# Patient Record
Sex: FEMALE | Race: WHITE | Employment: PART TIME | URBAN - METROPOLITAN AREA
[De-identification: names, ages, dates, MRNs, and addresses within clinical notes are randomized per-mention and may not be internally consistent; named-entity substitution may affect disease eponyms.]

---

## 2017-05-17 ENCOUNTER — OFFICE VISIT (OUTPATIENT)
Dept: INTERNAL MEDICINE CLINIC | Age: 57
End: 2017-05-17

## 2017-05-17 VITALS
BODY MASS INDEX: 33.12 KG/M2 | OXYGEN SATURATION: 98 % | WEIGHT: 194 LBS | HEART RATE: 72 BPM | HEIGHT: 64 IN | DIASTOLIC BLOOD PRESSURE: 87 MMHG | SYSTOLIC BLOOD PRESSURE: 130 MMHG

## 2017-05-17 DIAGNOSIS — I10 BENIGN ESSENTIAL HTN: Primary | ICD-10-CM

## 2017-05-17 DIAGNOSIS — E55.9 VITAMIN D DEFICIENCY: ICD-10-CM

## 2017-05-17 DIAGNOSIS — G43.009 MIGRAINE WITHOUT AURA AND WITHOUT STATUS MIGRAINOSUS, NOT INTRACTABLE: ICD-10-CM

## 2017-05-17 DIAGNOSIS — R35.0 URINARY FREQUENCY: ICD-10-CM

## 2017-05-17 DIAGNOSIS — Z13.1 SCREENING FOR DIABETES MELLITUS: ICD-10-CM

## 2017-05-17 PROCEDURE — 99204 OFFICE O/P NEW MOD 45 MIN: CPT | Performed by: INTERNAL MEDICINE

## 2017-05-17 RX ORDER — DILTIAZEM HYDROCHLORIDE 360 MG/1
360 CAPSULE, EXTENDED RELEASE ORAL DAILY
Qty: 30 CAPSULE | Refills: 3 | Status: SHIPPED | OUTPATIENT
Start: 2017-05-17 | End: 2017-10-14 | Stop reason: SDUPTHER

## 2017-05-17 RX ORDER — HYDROCHLOROTHIAZIDE 25 MG/1
25 TABLET ORAL DAILY
COMMUNITY
End: 2017-05-17 | Stop reason: SDUPTHER

## 2017-05-17 RX ORDER — DILTIAZEM HYDROCHLORIDE EXTENDED-RELEASE TABLETS 240 MG/1
240 TABLET, EXTENDED RELEASE ORAL DAILY
COMMUNITY
End: 2017-05-17

## 2017-05-17 RX ORDER — HYDROCHLOROTHIAZIDE 25 MG/1
25 TABLET ORAL DAILY
Qty: 90 TABLET | Refills: 1 | Status: SHIPPED | OUTPATIENT
Start: 2017-05-17 | End: 2017-12-14 | Stop reason: SDUPTHER

## 2017-05-17 ASSESSMENT — ENCOUNTER SYMPTOMS
ABDOMINAL PAIN: 1
WHEEZING: 1

## 2017-06-08 ENCOUNTER — TELEPHONE (OUTPATIENT)
Dept: INTERNAL MEDICINE CLINIC | Age: 57
End: 2017-06-08

## 2017-06-23 DIAGNOSIS — E55.9 VITAMIN D DEFICIENCY: ICD-10-CM

## 2017-06-23 DIAGNOSIS — I10 BENIGN ESSENTIAL HTN: ICD-10-CM

## 2017-06-23 LAB
A/G RATIO: 1.5 (ref 1.1–2.2)
ALBUMIN SERPL-MCNC: 4.6 G/DL (ref 3.4–5)
ALP BLD-CCNC: 109 U/L (ref 40–129)
ALT SERPL-CCNC: 30 U/L (ref 10–40)
ANION GAP SERPL CALCULATED.3IONS-SCNC: 17 MMOL/L (ref 3–16)
AST SERPL-CCNC: 22 U/L (ref 15–37)
BASOPHILS ABSOLUTE: 0 K/UL (ref 0–0.2)
BASOPHILS RELATIVE PERCENT: 0.9 %
BILIRUB SERPL-MCNC: 0.4 MG/DL (ref 0–1)
BUN BLDV-MCNC: 19 MG/DL (ref 7–20)
CALCIUM SERPL-MCNC: 10.5 MG/DL (ref 8.3–10.6)
CHLORIDE BLD-SCNC: 98 MMOL/L (ref 99–110)
CHOLESTEROL, TOTAL: 179 MG/DL (ref 0–199)
CO2: 25 MMOL/L (ref 21–32)
CREAT SERPL-MCNC: 0.7 MG/DL (ref 0.6–1.1)
EOSINOPHILS ABSOLUTE: 0.3 K/UL (ref 0–0.6)
EOSINOPHILS RELATIVE PERCENT: 5.5 %
GFR AFRICAN AMERICAN: >60
GFR NON-AFRICAN AMERICAN: >60
GLOBULIN: 3.1 G/DL
GLUCOSE BLD-MCNC: 105 MG/DL (ref 70–99)
HCT VFR BLD CALC: 41.6 % (ref 36–48)
HDLC SERPL-MCNC: 81 MG/DL (ref 40–60)
HEMOGLOBIN: 13.7 G/DL (ref 12–16)
LDL CHOLESTEROL CALCULATED: 82 MG/DL
LYMPHOCYTES ABSOLUTE: 1.7 K/UL (ref 1–5.1)
LYMPHOCYTES RELATIVE PERCENT: 31.6 %
MCH RBC QN AUTO: 30 PG (ref 26–34)
MCHC RBC AUTO-ENTMCNC: 32.9 G/DL (ref 31–36)
MCV RBC AUTO: 91 FL (ref 80–100)
MONOCYTES ABSOLUTE: 0.5 K/UL (ref 0–1.3)
MONOCYTES RELATIVE PERCENT: 9.3 %
NEUTROPHILS ABSOLUTE: 2.8 K/UL (ref 1.7–7.7)
NEUTROPHILS RELATIVE PERCENT: 52.7 %
PDW BLD-RTO: 14.1 % (ref 12.4–15.4)
PLATELET # BLD: 286 K/UL (ref 135–450)
PMV BLD AUTO: 9.2 FL (ref 5–10.5)
POTASSIUM SERPL-SCNC: 3.9 MMOL/L (ref 3.5–5.1)
RBC # BLD: 4.57 M/UL (ref 4–5.2)
SODIUM BLD-SCNC: 140 MMOL/L (ref 136–145)
TOTAL PROTEIN: 7.7 G/DL (ref 6.4–8.2)
TRIGL SERPL-MCNC: 81 MG/DL (ref 0–150)
TSH SERPL DL<=0.05 MIU/L-ACNC: 3.56 UIU/ML (ref 0.27–4.2)
VITAMIN B-12: 385 PG/ML (ref 211–911)
VITAMIN D 25-HYDROXY: 32.7 NG/ML
VLDLC SERPL CALC-MCNC: 16 MG/DL
WBC # BLD: 5.4 K/UL (ref 4–11)

## 2017-06-28 ENCOUNTER — OFFICE VISIT (OUTPATIENT)
Dept: INTERNAL MEDICINE CLINIC | Age: 57
End: 2017-06-28

## 2017-06-28 VITALS
HEART RATE: 72 BPM | DIASTOLIC BLOOD PRESSURE: 82 MMHG | HEIGHT: 64 IN | WEIGHT: 194.6 LBS | BODY MASS INDEX: 33.22 KG/M2 | OXYGEN SATURATION: 97 % | TEMPERATURE: 98.1 F | SYSTOLIC BLOOD PRESSURE: 120 MMHG

## 2017-06-28 DIAGNOSIS — I10 BENIGN ESSENTIAL HTN: Primary | ICD-10-CM

## 2017-06-28 PROCEDURE — 99213 OFFICE O/P EST LOW 20 MIN: CPT | Performed by: INTERNAL MEDICINE

## 2017-06-29 ENCOUNTER — TELEPHONE (OUTPATIENT)
Dept: INTERNAL MEDICINE CLINIC | Age: 57
End: 2017-06-29

## 2017-10-14 RX ORDER — DILTIAZEM HYDROCHLORIDE 360 MG/1
360 CAPSULE, EXTENDED RELEASE ORAL DAILY
Qty: 30 CAPSULE | Refills: 0 | Status: SHIPPED | OUTPATIENT
Start: 2017-10-14 | End: 2017-11-14 | Stop reason: SDUPTHER

## 2017-11-14 RX ORDER — DILTIAZEM HYDROCHLORIDE 360 MG/1
360 CAPSULE, EXTENDED RELEASE ORAL DAILY
Qty: 30 CAPSULE | Refills: 0 | Status: SHIPPED | OUTPATIENT
Start: 2017-11-14 | End: 2017-12-14 | Stop reason: SDUPTHER

## 2017-12-14 RX ORDER — DILTIAZEM HYDROCHLORIDE 360 MG/1
360 CAPSULE, EXTENDED RELEASE ORAL DAILY
Qty: 30 CAPSULE | Refills: 0 | Status: SHIPPED | OUTPATIENT
Start: 2017-12-14 | End: 2018-01-15 | Stop reason: SDUPTHER

## 2017-12-14 RX ORDER — HYDROCHLOROTHIAZIDE 25 MG/1
25 TABLET ORAL DAILY
Qty: 30 TABLET | Refills: 0 | Status: SHIPPED | OUTPATIENT
Start: 2017-12-14 | End: 2018-01-15 | Stop reason: SDUPTHER

## 2017-12-22 ENCOUNTER — TELEPHONE (OUTPATIENT)
Dept: INTERNAL MEDICINE CLINIC | Age: 57
End: 2017-12-22

## 2017-12-22 ENCOUNTER — OFFICE VISIT (OUTPATIENT)
Dept: INTERNAL MEDICINE CLINIC | Age: 57
End: 2017-12-22

## 2017-12-22 VITALS
HEIGHT: 64 IN | BODY MASS INDEX: 33.39 KG/M2 | SYSTOLIC BLOOD PRESSURE: 110 MMHG | TEMPERATURE: 97.6 F | RESPIRATION RATE: 16 BRPM | HEART RATE: 65 BPM | DIASTOLIC BLOOD PRESSURE: 70 MMHG | OXYGEN SATURATION: 98 % | WEIGHT: 195.6 LBS

## 2017-12-22 DIAGNOSIS — M54.42 ACUTE LEFT-SIDED BACK PAIN WITH SCIATICA: Primary | ICD-10-CM

## 2017-12-22 PROCEDURE — 99213 OFFICE O/P EST LOW 20 MIN: CPT | Performed by: INTERNAL MEDICINE

## 2017-12-22 RX ORDER — METHOCARBAMOL 750 MG/1
750 TABLET, FILM COATED ORAL 4 TIMES DAILY
COMMUNITY
End: 2018-06-26

## 2017-12-22 RX ORDER — OXYCODONE AND ACETAMINOPHEN 7.5; 325 MG/1; MG/1
1 TABLET ORAL EVERY 4 HOURS PRN
COMMUNITY
End: 2018-06-26

## 2017-12-22 RX ORDER — METHYLPREDNISOLONE 4 MG/1
TABLET ORAL
Qty: 1 KIT | Refills: 0 | Status: SHIPPED | OUTPATIENT
Start: 2017-12-22 | End: 2017-12-28

## 2017-12-22 RX ORDER — CYCLOBENZAPRINE HCL 5 MG
5 TABLET ORAL 3 TIMES DAILY PRN
Qty: 30 TABLET | Refills: 0 | Status: SHIPPED | OUTPATIENT
Start: 2017-12-22 | End: 2018-01-01

## 2017-12-22 ASSESSMENT — ENCOUNTER SYMPTOMS: BACK PAIN: 1

## 2017-12-22 NOTE — TELEPHONE ENCOUNTER
Pt. was offered per Dr. Cheryle Badillo a medrol dose pack or ov at 3:45 today with Dr. Clint Liriano. The pt. decided to come in to see Dr. Clint Liriano. I added her to Dr. Ramos Ribera schedule at 3:35 pm today.

## 2017-12-22 NOTE — PROGRESS NOTES
of motion. Neck supple. No JVD present. No tracheal deviation present. No thyromegaly present. Cardiovascular: Normal rate, regular rhythm, normal heart sounds and intact distal pulses. Exam reveals no gallop and no friction rub. No murmur heard. Pulmonary/Chest: Effort normal and breath sounds normal. No stridor. No respiratory distress. She has no wheezes. She has no rales. She exhibits no tenderness. Abdominal: Soft. Bowel sounds are normal. She exhibits no distension and no mass. There is no tenderness. There is no rebound and no guarding. Musculoskeletal: She exhibits no edema. Lumbar back: She exhibits decreased range of motion. Left upper leg: She exhibits tenderness. Lymphadenopathy:     She has no cervical adenopathy. Neurological: She is alert and oriented to person, place, and time. She has normal reflexes. No cranial nerve deficit. She exhibits normal muscle tone. Gait normal. Coordination normal.   Skin: Skin is warm and dry. No rash noted. She is not diaphoretic. No erythema. No pallor. Psychiatric: Mood, memory, affect and judgment normal.   Nursing note and vitals reviewed. Assessment/Plan:      Encounter Diagnoses   Name Primary?  Acute left-sided back pain with sciatica Yes       1. Acute left-sided back pain with sciatica  Already took muscle relaxants, ibuprofen, will treat with medrol + flexeril through the holidays, exercises given. Can go to PT in the future if this persists. - methylPREDNISolone (MEDROL DOSEPACK) 4 MG tablet; Take by mouth. Dispense: 1 kit; Refill: 0  - cyclobenzaprine (FLEXERIL) 5 MG tablet; Take 1 tablet by mouth 3 times daily as needed for Muscle spasms  Dispense: 30 tablet; Refill: 0        Additional Orders:      No orders of the defined types were placed in this encounter. Orders Placed This Encounter   Medications    methylPREDNISolone (MEDROL DOSEPACK) 4 MG tablet     Sig: Take by mouth.      Dispense:  1 kit     Refill:  0

## 2018-01-15 RX ORDER — HYDROCHLOROTHIAZIDE 25 MG/1
25 TABLET ORAL DAILY
Qty: 30 TABLET | Refills: 0 | Status: SHIPPED | OUTPATIENT
Start: 2018-01-15 | End: 2018-06-26

## 2018-01-15 RX ORDER — DILTIAZEM HYDROCHLORIDE 360 MG/1
360 CAPSULE, EXTENDED RELEASE ORAL DAILY
Qty: 30 CAPSULE | Refills: 0 | Status: SHIPPED | OUTPATIENT
Start: 2018-01-15 | End: 2018-06-26

## 2018-01-16 RX ORDER — DILTIAZEM HYDROCHLORIDE 360 MG/1
360 CAPSULE, EXTENDED RELEASE ORAL DAILY
Qty: 30 CAPSULE | Refills: 0 | Status: SHIPPED | OUTPATIENT
Start: 2018-01-16 | End: 2018-03-14 | Stop reason: SDUPTHER

## 2018-01-16 RX ORDER — HYDROCHLOROTHIAZIDE 25 MG/1
25 TABLET ORAL DAILY
Qty: 30 TABLET | Refills: 0 | Status: SHIPPED | OUTPATIENT
Start: 2018-01-16 | End: 2018-03-14 | Stop reason: SDUPTHER

## 2018-04-16 RX ORDER — HYDROCHLOROTHIAZIDE 25 MG/1
25 TABLET ORAL DAILY
Qty: 30 TABLET | Refills: 0 | Status: SHIPPED | OUTPATIENT
Start: 2018-04-16 | End: 2018-05-14 | Stop reason: SDUPTHER

## 2018-04-16 RX ORDER — DILTIAZEM HYDROCHLORIDE 360 MG/1
360 CAPSULE, EXTENDED RELEASE ORAL DAILY
Qty: 30 CAPSULE | Refills: 0 | Status: SHIPPED | OUTPATIENT
Start: 2018-04-16 | End: 2018-05-14 | Stop reason: SDUPTHER

## 2018-05-14 RX ORDER — HYDROCHLOROTHIAZIDE 25 MG/1
25 TABLET ORAL DAILY
Qty: 30 TABLET | Refills: 0 | Status: SHIPPED | OUTPATIENT
Start: 2018-05-14 | End: 2018-06-13 | Stop reason: SDUPTHER

## 2018-05-14 RX ORDER — DILTIAZEM HYDROCHLORIDE 360 MG/1
360 CAPSULE, EXTENDED RELEASE ORAL DAILY
Qty: 30 CAPSULE | Refills: 0 | Status: SHIPPED | OUTPATIENT
Start: 2018-05-14 | End: 2018-06-13 | Stop reason: SDUPTHER

## 2018-06-13 RX ORDER — HYDROCHLOROTHIAZIDE 25 MG/1
25 TABLET ORAL DAILY
Qty: 30 TABLET | Refills: 0 | Status: SHIPPED | OUTPATIENT
Start: 2018-06-13 | End: 2018-06-26 | Stop reason: SDUPTHER

## 2018-06-13 RX ORDER — DILTIAZEM HYDROCHLORIDE 360 MG/1
360 CAPSULE, EXTENDED RELEASE ORAL DAILY
Qty: 30 CAPSULE | Refills: 0 | Status: SHIPPED | OUTPATIENT
Start: 2018-06-13 | End: 2018-06-26 | Stop reason: SDUPTHER

## 2018-06-26 ENCOUNTER — OFFICE VISIT (OUTPATIENT)
Dept: INTERNAL MEDICINE CLINIC | Age: 58
End: 2018-06-26

## 2018-06-26 VITALS
HEIGHT: 64 IN | WEIGHT: 183.6 LBS | BODY MASS INDEX: 31.34 KG/M2 | DIASTOLIC BLOOD PRESSURE: 74 MMHG | HEART RATE: 68 BPM | OXYGEN SATURATION: 98 % | SYSTOLIC BLOOD PRESSURE: 130 MMHG

## 2018-06-26 DIAGNOSIS — I10 BENIGN ESSENTIAL HTN: ICD-10-CM

## 2018-06-26 DIAGNOSIS — Z00.00 ANNUAL PHYSICAL EXAM: Primary | ICD-10-CM

## 2018-06-26 DIAGNOSIS — M25.561 ACUTE PAIN OF RIGHT KNEE: ICD-10-CM

## 2018-06-26 PROCEDURE — 99396 PREV VISIT EST AGE 40-64: CPT | Performed by: INTERNAL MEDICINE

## 2018-06-26 RX ORDER — HYDROCHLOROTHIAZIDE 25 MG/1
25 TABLET ORAL DAILY
Qty: 90 TABLET | Refills: 3 | Status: SHIPPED | OUTPATIENT
Start: 2018-06-26 | End: 2019-07-08 | Stop reason: SDUPTHER

## 2018-06-26 RX ORDER — DILTIAZEM HYDROCHLORIDE 360 MG/1
360 CAPSULE, EXTENDED RELEASE ORAL DAILY
Qty: 90 CAPSULE | Refills: 3 | Status: SHIPPED | OUTPATIENT
Start: 2018-06-26 | End: 2019-07-08 | Stop reason: SDUPTHER

## 2018-06-26 ASSESSMENT — PATIENT HEALTH QUESTIONNAIRE - PHQ9
1. LITTLE INTEREST OR PLEASURE IN DOING THINGS: 0
2. FEELING DOWN, DEPRESSED OR HOPELESS: 0
SUM OF ALL RESPONSES TO PHQ9 QUESTIONS 1 & 2: 0
SUM OF ALL RESPONSES TO PHQ QUESTIONS 1-9: 0

## 2021-03-11 DIAGNOSIS — Z00.00 ANNUAL PHYSICAL EXAM: ICD-10-CM

## 2021-03-11 LAB
A/G RATIO: 1.4 (ref 1.1–2.2)
ALBUMIN SERPL-MCNC: 4.2 G/DL (ref 3.4–5)
ALP BLD-CCNC: 88 U/L (ref 40–129)
ALT SERPL-CCNC: 16 U/L (ref 10–40)
ANION GAP SERPL CALCULATED.3IONS-SCNC: 11 MMOL/L (ref 3–16)
AST SERPL-CCNC: 20 U/L (ref 15–37)
BASOPHILS ABSOLUTE: 0 K/UL (ref 0–0.2)
BASOPHILS RELATIVE PERCENT: 0.8 %
BILIRUB SERPL-MCNC: 0.3 MG/DL (ref 0–1)
BUN BLDV-MCNC: 15 MG/DL (ref 7–20)
CALCIUM SERPL-MCNC: 9.5 MG/DL (ref 8.3–10.6)
CHLORIDE BLD-SCNC: 104 MMOL/L (ref 99–110)
CHOLESTEROL, TOTAL: 171 MG/DL (ref 0–199)
CO2: 25 MMOL/L (ref 21–32)
CREAT SERPL-MCNC: 0.7 MG/DL (ref 0.6–1.2)
EOSINOPHILS ABSOLUTE: 0.2 K/UL (ref 0–0.6)
EOSINOPHILS RELATIVE PERCENT: 4.2 %
GFR AFRICAN AMERICAN: >60
GFR NON-AFRICAN AMERICAN: >60
GLOBULIN: 3.1 G/DL
GLUCOSE BLD-MCNC: 108 MG/DL (ref 70–99)
HCT VFR BLD CALC: 39.2 % (ref 36–48)
HDLC SERPL-MCNC: 72 MG/DL (ref 40–60)
HEMOGLOBIN: 13.3 G/DL (ref 12–16)
LDL CHOLESTEROL CALCULATED: 89 MG/DL
LYMPHOCYTES ABSOLUTE: 1.2 K/UL (ref 1–5.1)
LYMPHOCYTES RELATIVE PERCENT: 27.7 %
MCH RBC QN AUTO: 30.9 PG (ref 26–34)
MCHC RBC AUTO-ENTMCNC: 34.1 G/DL (ref 31–36)
MCV RBC AUTO: 90.5 FL (ref 80–100)
MONOCYTES ABSOLUTE: 0.4 K/UL (ref 0–1.3)
MONOCYTES RELATIVE PERCENT: 9.5 %
NEUTROPHILS ABSOLUTE: 2.6 K/UL (ref 1.7–7.7)
NEUTROPHILS RELATIVE PERCENT: 57.8 %
PDW BLD-RTO: 13.7 % (ref 12.4–15.4)
PLATELET # BLD: 292 K/UL (ref 135–450)
PMV BLD AUTO: 9 FL (ref 5–10.5)
POTASSIUM SERPL-SCNC: 3.7 MMOL/L (ref 3.5–5.1)
RBC # BLD: 4.32 M/UL (ref 4–5.2)
SODIUM BLD-SCNC: 140 MMOL/L (ref 136–145)
TOTAL PROTEIN: 7.3 G/DL (ref 6.4–8.2)
TRIGL SERPL-MCNC: 48 MG/DL (ref 0–150)
TSH SERPL DL<=0.05 MIU/L-ACNC: 2.28 UIU/ML (ref 0.27–4.2)
VLDLC SERPL CALC-MCNC: 10 MG/DL
WBC # BLD: 4.5 K/UL (ref 4–11)

## 2021-03-12 LAB
ESTIMATED AVERAGE GLUCOSE: 116.9 MG/DL
HBA1C MFR BLD: 5.7 %

## 2021-05-08 ENCOUNTER — APPOINTMENT (OUTPATIENT)
Dept: GENERAL RADIOLOGY | Age: 61
End: 2021-05-08
Payer: COMMERCIAL

## 2021-05-08 ENCOUNTER — HOSPITAL ENCOUNTER (EMERGENCY)
Age: 61
Discharge: HOME | End: 2021-05-08
Payer: COMMERCIAL

## 2021-05-08 ENCOUNTER — IMMUNIZATION (OUTPATIENT)
Dept: PRIMARY CARE CLINIC | Age: 61
End: 2021-05-08
Payer: COMMERCIAL

## 2021-05-08 VITALS
OXYGEN SATURATION: 97 % | RESPIRATION RATE: 14 BRPM | HEIGHT: 64 IN | TEMPERATURE: 97.8 F | SYSTOLIC BLOOD PRESSURE: 130 MMHG | BODY MASS INDEX: 32.85 KG/M2 | DIASTOLIC BLOOD PRESSURE: 71 MMHG | HEART RATE: 72 BPM

## 2021-05-08 DIAGNOSIS — S92.352A CLOSED DISPLACED FRACTURE OF FIFTH METATARSAL BONE OF LEFT FOOT, INITIAL ENCOUNTER: Primary | ICD-10-CM

## 2021-05-08 DIAGNOSIS — S82.891A CLOSED RIGHT ANKLE FRACTURE, INITIAL ENCOUNTER: ICD-10-CM

## 2021-05-08 PROCEDURE — 99285 EMERGENCY DEPT VISIT HI MDM: CPT

## 2021-05-08 PROCEDURE — 0011A COVID-19, MODERNA VACCINE 100MCG/0.5ML DOSE: CPT | Performed by: FAMILY MEDICINE

## 2021-05-08 PROCEDURE — 73630 X-RAY EXAM OF FOOT: CPT

## 2021-05-08 PROCEDURE — 91301 COVID-19, MODERNA VACCINE 100MCG/0.5ML DOSE: CPT | Performed by: FAMILY MEDICINE

## 2021-05-08 PROCEDURE — 29515 APPLICATION SHORT LEG SPLINT: CPT

## 2021-05-08 PROCEDURE — 6370000000 HC RX 637 (ALT 250 FOR IP): Performed by: PHYSICIAN ASSISTANT

## 2021-05-08 PROCEDURE — 73610 X-RAY EXAM OF ANKLE: CPT

## 2021-05-08 RX ORDER — HYDROCODONE BITARTRATE AND ACETAMINOPHEN 5; 325 MG/1; MG/1
1 TABLET ORAL EVERY 6 HOURS PRN
Qty: 5 TABLET | Refills: 0 | Status: SHIPPED | OUTPATIENT
Start: 2021-05-08 | End: 2021-05-16

## 2021-05-08 RX ORDER — ACETAMINOPHEN 325 MG/1
650 TABLET ORAL ONCE
Status: COMPLETED | OUTPATIENT
Start: 2021-05-08 | End: 2021-05-08

## 2021-05-08 RX ADMIN — ACETAMINOPHEN 650 MG: 325 TABLET ORAL at 15:37

## 2021-05-08 ASSESSMENT — PAIN - FUNCTIONAL ASSESSMENT: PAIN_FUNCTIONAL_ASSESSMENT: 0-10

## 2021-05-08 ASSESSMENT — PAIN DESCRIPTION - FREQUENCY: FREQUENCY: CONTINUOUS

## 2021-05-08 ASSESSMENT — ENCOUNTER SYMPTOMS
CHEST TIGHTNESS: 0
VOMITING: 0
ABDOMINAL PAIN: 0
SHORTNESS OF BREATH: 0

## 2021-05-08 ASSESSMENT — PAIN SCALES - GENERAL: PAINLEVEL_OUTOF10: 5

## 2021-05-08 ASSESSMENT — PAIN DESCRIPTION - DESCRIPTORS: DESCRIPTORS: ACHING

## 2021-05-08 ASSESSMENT — PAIN DESCRIPTION - ONSET: ONSET: ON-GOING

## 2021-05-08 NOTE — ED NOTES
Bed: E-41  Expected date: 5/8/21  Expected time: 2:17 PM  Means of arrival: [facility]  Comments:   Ankle injury     [address]  05/08/21 6102

## 2021-05-08 NOTE — ED PROVIDER NOTES
[address]  [address] [address]  Dept: [phone]  Loc: [phone]  eMERGENCYdEPARTMENT eNCOUnter      Pt Name: Vlad Payne  MRN: [id number]  [place] 1960  Date of evaluation: 5/8/2021  Provider:Ofelia Oliva PA-C    CHIEF COMPLAINT       Chief Complaint   Patient presents with    Fall     Patient was walking on steps and her ankle rolled and patient fell down. Denies loss of consciousness       CRITICAL CARE TIME   Total Critical Care time was 0 minutes, excluding separately reportable procedures. There was a high probability of clinically significant/life threatening deterioration in the patient's condition which required my urgentintervention. HISTORY OF PRESENT ILLNESS  (Location/Symptom, Timing/Onset, Context/Setting, Quality, Duration,Modifying Factors, Severity.)   Vlad Payne is a 61 y.o. female who presents to the emergency department by private vehicle complaining of ankle injury from fall. Patient is walking on steps and rolled her left ankle. She fell to the ground and injured both ankles, R>L. Fall was mechanical.  She has any chest pain, shortness breath, abdominal pain. No other extremity injuries, neck pain or back pain. Did bump her forehead on the ground. No injury sustained to head. Denies any headaches, vision changes, vomiting. There is no amnesia or loss of consciousness. She is not on anticoagulants. All pain isolated to ankles bilaterally, particularly right. Pain rated 7/10. Pain worsens with movement. Nursing Notes were reviewedand agreed with or any disagreements were addressed in the HPI. REVIEW OF SYSTEMS    (2-9 systems for level 4, 10 or more for level 5)     Review of Systems   Constitutional: Negative for chills and fever. HENT: Negative. Respiratory: Negative for chest tightness and shortness of breath. Cardiovascular: Negative. Gastrointestinal: Negative for abdominal pain and vomiting. Genitourinary: Negative. Musculoskeletal: Positive for arthralgias. Negative for myalgias. Skin: Negative for wound. Neurological: Negative for dizziness, light-headedness and headaches. Psychiatric/Behavioral: Negative for behavioral problems and confusion. Except as noted above the remainder of the review of systems was reviewed and negative. PAST MEDICAL HISTORY         Diagnosis Date    Anxiety     Class 1 obesity due to excess calories without serious comorbidity with body mass index (BMI) of 32.0 to 32.9 in adult     History of sciatica     left side has been twice    HTN (hypertension)     Postmenopausal        SURGICAL HISTORY           Procedure Laterality Date    THYROIDECTOMY, PARTIAL      benign thyroid tumor removed , half lobe remaining       CURRENT MEDICATIONS     [unfilled]    ALLERGIES     Patient has no known allergies. FAMILY HISTORY           Problem Relation Age of Onset    High Blood Pressure Mother         aunts with htn    Diabetes Mother     Anxiety Disorder Other     Drug Abuse Other      Family Status   Relation Name Status    Mother      Father  Alive    Sister  Alive    Brother  Alive    Other  (Not Specified)        SOCIAL HISTORY      reports that she has never smoked. She has never used smokeless tobacco. She reports current alcohol use. She reports that she does not use drugs. PHYSICAL EXAM    (up to 7 for level 4, 8 or more for level 5)     ED Triage Vitals [21 1428]   Enc Vitals Group      /71      Pulse 72      Resp 14      Temp 97.8 °F (36.6 °C)      Temp Source Oral      SpO2 97 %      Weight       Height 5' 4\" (1.626 m)      Head Circumference       Peak Flow       Pain Score       Pain Loc       Pain Edu? Excl. in [address]? Physical Exam  Vitals signs reviewed. Constitutional:       Appearance: Normal appearance.    HENT: Head: Normocephalic and atraumatic. Neck:      Musculoskeletal: Normal range of motion and neck supple. Pulmonary:      Effort: Pulmonary effort is normal. No respiratory distress. Abdominal:      Palpations: Abdomen is soft. Tenderness: There is no abdominal tenderness. There is no guarding or rebound. Musculoskeletal:      Comments: BUE: No focal tenderness to palpation, full range of motion present  Back: No midline spinous process tenderness to thoracic or lumbar spine  RLE: Tender to palpation to lateral malleolus, tenderness exacerbation throughout midfoot. Sensation intact distally, pedal pulse +2, wiggles toes without difficulty. LLE: Ecchymosis, edema and tenderness along lateral aspect of mid to distal foot. Wiggles toes without difficulty. Pedal pulse was 2, sensation intact distally, no other focal tenderness to palpation throughout extremity. Skin:     General: Skin is warm. Neurological:      General: No focal deficit present. Mental Status: She is alert and oriented to person, place, and time. Cranial Nerves: No cranial nerve deficit. Motor: No weakness. Coordination: Coordination normal.   Psychiatric:         Mood and Affect: Mood normal.         Behavior: Behavior normal.           DIAGNOSTIC RESULTS     EKG: All EKG's are interpreted by the Emergency Department Physician who either signs or Co-signs this chart in the absence of a cardiologist.    RADIOLOGY:   Non-plain film images such as CT, Ultrasound and MRI are read by the radiologist. Plain radiographic images are visualized and preliminarilyinterpreted by the emergency physician with the below findings:    Interpretation per the Radiologist below,if available at the time of this note:    XR FOOT RIGHT (MIN 3 VIEWS)   Final Result   Acute, minimally displaced fracture of the distal fibula of the right ankle. Widening of the medial aspect of the ankle mortise may indicate ligamentous   injury. No acute osseous injury of the right foot. XR FOOT LEFT (MIN 3 VIEWS)   Final Result   1. Traumatic minimally displaced 5th metatarsal neck fracture. XR ANKLE RIGHT (MIN 3 VIEWS)   Final Result   Acute, minimally displaced fracture of the distal fibula of the right ankle. Widening of the medial aspect of the ankle mortise may indicate ligamentous   injury. No acute osseous injury of the right foot. Xray reviewed by myself, [facility] ANGELINA, and showed mildly displaced distal fibula fracture on right ankle. No other osseous abnormality noted throughout ankle or foot. Fracture to left fifth metatarsal at neck. LABS:  Labs Reviewed - No data to display    All other labs were within normal range or not returned as of this dictation. EMERGENCY DEPARTMENT COURSE and DIFFERENTIAL DIAGNOSIS/MDM:   Vitals:    Vitals:    05/08/21 1428   BP: 130/71   Pulse: 72   Resp: 14   Temp: 97.8 °F (36.6 °C)   TempSrc: Oral   SpO2: 97%   Height: 5' 4\" (1.626 m)       MDM     Patient presents to the ED with HPI noted above. She is hemodynamically stable, afebrile nontoxic-appearing. She is hypoxic oxygen saturation 97% on room air. Patient did hit her head. This is not a significant injury per patient. She has no headache. No evidence of head injury. She is neurologically intact. CT head not indicated this time. Patient not meet criteria based on [place] [facility]) rules and clinically low suspicion. Patient with right ankle/foot injury and left foot injury. X-rays obtained. X-rays reviewed and as above. Patient has acute minimally displaced fracture of the distal fibula of right ankle as well as widening of the medial aspect of ankle mortise. No fracture of foot. Patient has a traumatic minimally displaced fifth metatarsal neck fracture on left foot. Patient will be nonweightbearing in right lower extremity. She is placed in posterior sugar tong splint and provided crutches. Prescriptions    HYDROCODONE-ACETAMINOPHEN (NORCO) 5-325 MG PER TABLET    Take 1 tablet by mouth every 6 hours as needed for Pain for up to 8 days.        (Please note that portions of this note were completed with a voice recognition program.  Efforts were made to edit the dictations but occasionally words are mis-transcribed.)    [facility]          [address] [place] [address]  05/08/21 5642

## 2021-05-11 ENCOUNTER — OFFICE VISIT (OUTPATIENT)
Dept: ORTHOPEDIC SURGERY | Age: 61
End: 2021-05-11
Payer: COMMERCIAL

## 2021-05-11 ENCOUNTER — TELEPHONE (OUTPATIENT)
Dept: ORTHOPEDIC SURGERY | Age: 61
End: 2021-05-11

## 2021-05-11 VITALS — WEIGHT: 191 LBS | HEIGHT: 64 IN | BODY MASS INDEX: 32.61 KG/M2

## 2021-05-11 DIAGNOSIS — S92.352A CLOSED FRACTURE OF BASE OF FIFTH METATARSAL BONE OF LEFT FOOT: ICD-10-CM

## 2021-05-11 DIAGNOSIS — S82.831A OTHER CLOSED FRACTURE OF DISTAL END OF RIGHT FIBULA, INITIAL ENCOUNTER: Primary | ICD-10-CM

## 2021-05-11 PROCEDURE — 99243 OFF/OP CNSLTJ NEW/EST LOW 30: CPT | Performed by: ORTHOPAEDIC SURGERY

## 2021-05-11 PROCEDURE — 27786 TREATMENT OF ANKLE FRACTURE: CPT | Performed by: ORTHOPAEDIC SURGERY

## 2021-05-11 PROCEDURE — L4361 PNEUMA/VAC WALK BOOT PRE OTS: HCPCS | Performed by: ORTHOPAEDIC SURGERY

## 2021-05-11 PROCEDURE — 28470 CLTX METATARSAL FX WO MNP EA: CPT | Performed by: ORTHOPAEDIC SURGERY

## 2021-05-11 NOTE — PROGRESS NOTES
Shannon Hendrickson  <[id number]>  May 11, 2021    Chief Complaint   Patient presents with    New Patient     Left foot and right ankle           History: The patient is a 80-year-old female who is here for evaluation of her left foot and her right ankle. The patient reportedly was walking down her deck steps and she twisted her left foot and then severely inverted her right ankle. She had difficulty bearing weight. She ultimately presented to the pain. She was placed in a boot for the left foot. Her pain is moderate. She denies any loss of consciousness. She denies any numbness or tingling. This is a consult from [place], [place] for left foot pain and right ankle pain. The patient's  past medical history, medications, allergies,  family history, social history, and have been reviewed, and dated and are recorded in the chart. Pertinent items are noted in HPI. Review of systems reviewed from Pertinent History Form dated on 5/11/2021 and available in the patient's chart under the Media tab. Ht 5' 4\" (1.626 m)   Wt 191 lb (86.6 kg)   BMI 32.79 kg/m²     Physical Examination:   The patient presents today in no acute distress, awake, alert, and oriented. She is well dressed, nourished and  groomed. Patient with normal affect. On examination, the patient is in a wheelchair. She is unable to walk. We removed the splint for the right ankle. She is in a boot on the left side. The patient has moderate left foot swelling. She has moderate right ankle swelling. She has intact sensation to light touch in the tibial, peroneal, sural, and saphenous nerve distributions bilaterally. Pedal pulses are 1-2 + bilaterally. She has significant tenderness on deep palpation over the Lateral malleolus of the right ankle. She has severe tenderness to palpation over the left metatarsal neck. He is nontender over the left midfoot or hindfoot. She is nontender about the right foot.   Examination of the skin reveals no lesions or ulcerations bilaterally. She is able to lightly flex and extend all toes without difficulty. Imaging:  Xrays, 3 views of the right ankle from initial evaluation were reviewed. The x-rays reveal a right nondisplaced oblique distal fibula fracture. The mortise is intact. The x-rays of the left foot obtained in the emergency room were extensively reviewed. The x-rays confirm a minimally displaced fifth metatarsal neck fracture. Impression:   Right distal fibula fracture #2 left fifth metatarsal fracture    Plan: At this time, we will treat the patient nonoperatively. She will be put in a boot for the left side. She will also be put in a boot for the right side. She may gradually begin to weight-bear as tolerated on the left side immediately. The patient may begin to bear weight on the right side in 2 weeks. She may begin to lightly range the right ankle in approximately 2 weeks. She may work on light range of motion to the left ankle now. She will ice and elevate both lower extremities as much as possible. She is a  and will be off work for the next few weeks.

## 2021-05-11 NOTE — LETTER
[facility]  [facility] [address] [address]  Phone: [phone]  Fax: [phone]    Batool Obrien MD        May 11, 2021     Patient: Sayda Yoon   YOB: 1960   Date of Visit: 5/11/2021       To Whom It May Concern: It is my medical opinion that Sayda Yoon should remain out of work for 3 weeks. She may then return to work light duty: minimal walking. If you have any questions or concerns, please don't hesitate to call.     Sincerely,          Batool Obrien MD

## 2021-06-11 ENCOUNTER — OFFICE VISIT (OUTPATIENT)
Dept: ORTHOPEDIC SURGERY | Age: 61
End: 2021-06-11

## 2021-06-11 VITALS — BODY MASS INDEX: 32.61 KG/M2 | HEIGHT: 64 IN | RESPIRATION RATE: 14 BRPM | WEIGHT: 191 LBS

## 2021-06-11 DIAGNOSIS — S92.352A CLOSED FRACTURE OF BASE OF FIFTH METATARSAL BONE OF LEFT FOOT: Primary | ICD-10-CM

## 2021-06-11 DIAGNOSIS — S82.831A OTHER CLOSED FRACTURE OF DISTAL END OF RIGHT FIBULA, INITIAL ENCOUNTER: ICD-10-CM

## 2021-06-11 PROCEDURE — 99024 POSTOP FOLLOW-UP VISIT: CPT | Performed by: ORTHOPAEDIC SURGERY

## 2021-06-11 NOTE — PROGRESS NOTES
in the office today were extensively reviewed. The fifth metatarsal neck fracture is healing well. There is abundant callus. Impression:   Right distal fibula fracture #2 left fifth metatarsal fracture    Plan: At this time, the patient may discontinue the boot on the left side. She may weight-bear as tolerated on the left side. She may also begin to weight-bear as tolerated in the boot on the right side. She will elevate both lower extremities is much as possible. She will work aggressively on range of motion. The patient will follow up with me in 4 weeks and we will reassess her then. At follow-up, 3 views of the left foot and 3 views of the right ankle will be obtained. She may discontinue the boot on the right side in 2 weeks.   If she is not regaining her motion and strength, we will consider a therapy program.

## 2021-06-30 ENCOUNTER — TELEPHONE (OUTPATIENT)
Dept: ORTHOPEDIC SURGERY | Age: 61
End: 2021-06-30

## 2021-06-30 NOTE — TELEPHONE ENCOUNTER
General Question     Subject: RT ANKLE  Patient and /or Facility Request: Nato Garner"  Contact Number: [phone]    PATIENT CALLING BECAUSE HER RT ANKLE IS SWELLING UP TO HER KNEE.     WANTS TO KNOW IF THIS IS NORMAL    PLEASE CALL BACK PATIENT AT THE ABOVE NUMBER

## 2021-06-30 NOTE — TELEPHONE ENCOUNTER
I called the patient and informed her that swelling is common at this point from her injury. She has recently started to [facility] and D/C her boot.

## 2021-07-09 ENCOUNTER — OFFICE VISIT (OUTPATIENT)
Dept: ORTHOPEDIC SURGERY | Age: 61
End: 2021-07-09

## 2021-07-09 VITALS — WEIGHT: 191 LBS | RESPIRATION RATE: 14 BRPM | BODY MASS INDEX: 32.61 KG/M2 | HEIGHT: 64 IN

## 2021-07-09 DIAGNOSIS — S92.352A CLOSED FRACTURE OF BASE OF FIFTH METATARSAL BONE OF LEFT FOOT: Primary | ICD-10-CM

## 2021-07-09 DIAGNOSIS — M21.6X9 PLANOVALGUS DEFORMITY OF FOOT, ACQUIRED, UNSPECIFIED LATERALITY: ICD-10-CM

## 2021-07-09 DIAGNOSIS — S82.831A OTHER CLOSED FRACTURE OF DISTAL END OF RIGHT FIBULA, INITIAL ENCOUNTER: ICD-10-CM

## 2021-07-09 PROCEDURE — 99024 POSTOP FOLLOW-UP VISIT: CPT | Performed by: ORTHOPAEDIC SURGERY

## 2021-07-09 NOTE — PROGRESS NOTES
Jocy Cruz  <[id number]>  July 9, 2021    Chief Complaint   Patient presents with    Ankle Pain     CK L ANKLE     Foot Pain     CK L FOOT            History: The patient is a 57-year-old female who is here for evaluation of her left foot and her right ankle. She is no longer using assistive devices. She reports minimal to no pain in her left foot. She reports very mild pain in her right ankle. She denies any numbness or tingling. She is now 9 weeks post injury. The patient's  past medical history, medications, allergies,  family history, social history, and have been reviewed, and dated and are recorded in the chart. Pertinent items are noted in HPI. Review of systems reviewed from Pertinent History Form dated on 5/11/2021 and available in the patient's chart under the Media tab. Resp 14   Ht 5' 4\" (1.626 m)   Wt 191 lb (86.6 kg)   BMI 32.79 kg/m²     Physical Examination:   The patient presents today in no acute distress, awake, alert, and oriented. She is well dressed, nourished and  groomed. Patient with normal affect. On examination, the patient is walking without assistive devices. She has no tenderness to palpation over the left fifth metatarsal neck. She has no tenderness over the right distal fibula. She has mild to moderate swelling of the right ankle. She has no swelling of the left foot. She remains neurovascularly intact. She has intact sensation to light touch in the tibial, peroneal, sural, and saphenous nerve distributions bilaterally. Pedal pulses are 1-2 + bilaterally. She is able to lightly dorsiflex and plantarflex both ankles. Examination of the skin reveals no lesions or ulcerations bilaterally. She is able to lightly flex and extend all toes without difficulty. She does have flexible flat feet bilaterally. Imaging:  Xrays, 3 views of the right ankle pain in the office today were extensively reviewed. The fibula fracture is healing well.   The mortise remains intact. Alignment of the fibula is well-maintained. The fibula is well-healed. 2 views of the left foot obtained in the office today were extensively reviewed. The fifth metatarsal neck fracture is completely healed. There is abundant callus. Impression:   Right distal fibula fracture #2 left fifth metatarsal fracture    Plan: At this time, we encouraged the patient to work on range of motion and strengthening. She may gradually resume regular activities. She was given a prescription for custom molded arch supports. The patient will follow up with me in 4 to 5 weeks and we will reassess her then. We encouraged the patient to use compression stockings for the right lower extremity.

## 2021-11-11 ENCOUNTER — E-VISIT (OUTPATIENT)
Dept: INTERNAL MEDICINE CLINIC | Age: 61
End: 2021-11-11
Payer: COMMERCIAL

## 2021-11-11 DIAGNOSIS — H10.30 ACUTE BACTERIAL CONJUNCTIVITIS, UNSPECIFIED LATERALITY: Primary | ICD-10-CM

## 2021-11-11 PROCEDURE — 99422 OL DIG E/M SVC 11-20 MIN: CPT | Performed by: INTERNAL MEDICINE

## 2021-11-11 RX ORDER — TOBRAMYCIN AND DEXAMETHASONE 3; 1 MG/ML; MG/ML
1 SUSPENSION/ DROPS OPHTHALMIC
Qty: 1 EACH | Refills: 0 | Status: SHIPPED | OUTPATIENT
Start: 2021-11-11 | End: 2021-11-21

## 2021-11-11 NOTE — PROGRESS NOTES
Can send you a drop  Do you wear contacts ? That matters   And do your eyes hurt or light hurts your eyes.   ?

## 2021-12-28 PROBLEM — E66.9 OBESITY (BMI 30.0-34.9): Status: ACTIVE | Noted: 2021-12-28

## 2022-01-04 ENCOUNTER — NURSE ONLY (OUTPATIENT)
Dept: PRIMARY CARE CLINIC | Age: 62
End: 2022-01-04

## 2022-01-04 DIAGNOSIS — Z11.52 ENCOUNTER FOR SCREENING FOR COVID-19: Primary | ICD-10-CM

## 2022-01-04 NOTE — PROGRESS NOTES
Claude Banana received a viral test for COVID-19. They were educated on isolation and quarantine as appropriate. For any symptoms, they were directed to seek care from their PCP, given contact information to establish with a doctor, directed to an urgent care or the emergency room.

## 2022-01-05 LAB — SARS-COV-2: DETECTED
